# Patient Record
Sex: FEMALE | Race: WHITE | NOT HISPANIC OR LATINO | Employment: OTHER | URBAN - METROPOLITAN AREA
[De-identification: names, ages, dates, MRNs, and addresses within clinical notes are randomized per-mention and may not be internally consistent; named-entity substitution may affect disease eponyms.]

---

## 2019-12-08 ENCOUNTER — HOSPITAL ENCOUNTER (EMERGENCY)
Facility: CLINIC | Age: 69
Discharge: HOME | End: 2019-12-08
Attending: EMERGENCY MEDICINE | Admitting: EMERGENCY MEDICINE
Payer: MEDICARE

## 2019-12-08 ENCOUNTER — APPOINTMENT (OUTPATIENT)
Dept: GENERAL RADIOLOGY | Facility: CLINIC | Age: 69
End: 2019-12-08
Attending: EMERGENCY MEDICINE
Payer: MEDICARE

## 2019-12-08 VITALS
TEMPERATURE: 98.1 F | SYSTOLIC BLOOD PRESSURE: 174 MMHG | WEIGHT: 160 LBS | DIASTOLIC BLOOD PRESSURE: 96 MMHG | HEART RATE: 87 BPM | RESPIRATION RATE: 14 BRPM | BODY MASS INDEX: 27.31 KG/M2 | HEIGHT: 64 IN | OXYGEN SATURATION: 94 %

## 2019-12-08 DIAGNOSIS — S62.102A CLOSED FRACTURE OF LEFT WRIST, INITIAL ENCOUNTER: ICD-10-CM

## 2019-12-08 PROCEDURE — 99152 MOD SED SAME PHYS/QHP 5/>YRS: CPT

## 2019-12-08 PROCEDURE — 40000986 XR WRIST LEFT 2 VIEW: Mod: LT

## 2019-12-08 PROCEDURE — 99153 MOD SED SAME PHYS/QHP EA: CPT

## 2019-12-08 PROCEDURE — 99285 EMERGENCY DEPT VISIT HI MDM: CPT | Mod: 25

## 2019-12-08 PROCEDURE — 25000132 ZZH RX MED GY IP 250 OP 250 PS 637: Mod: GY | Performed by: EMERGENCY MEDICINE

## 2019-12-08 PROCEDURE — 25000128 H RX IP 250 OP 636: Performed by: EMERGENCY MEDICINE

## 2019-12-08 PROCEDURE — 25605 CLTX DST RDL FX/EPHYS SEP W/: CPT | Mod: LT

## 2019-12-08 PROCEDURE — 73110 X-RAY EXAM OF WRIST: CPT | Mod: LT

## 2019-12-08 PROCEDURE — 40000275 ZZH STATISTIC RCP TIME EA 10 MIN

## 2019-12-08 RX ORDER — IBUPROFEN 600 MG/1
600 TABLET, FILM COATED ORAL ONCE
Status: COMPLETED | OUTPATIENT
Start: 2019-12-08 | End: 2019-12-08

## 2019-12-08 RX ORDER — PROPOFOL 10 MG/ML
INJECTION, EMULSION INTRAVENOUS DAILY PRN
Status: COMPLETED | OUTPATIENT
Start: 2019-12-08 | End: 2019-12-08

## 2019-12-08 RX ORDER — PROPOFOL 10 MG/ML
1 INJECTION, EMULSION INTRAVENOUS ONCE
Status: DISCONTINUED | OUTPATIENT
Start: 2019-12-08 | End: 2019-12-08 | Stop reason: HOSPADM

## 2019-12-08 RX ADMIN — PROPOFOL 10 MG: 10 INJECTION, EMULSION INTRAVENOUS at 16:53

## 2019-12-08 RX ADMIN — PROPOFOL 30 MG: 10 INJECTION, EMULSION INTRAVENOUS at 16:52

## 2019-12-08 RX ADMIN — PROPOFOL 70 MG: 10 INJECTION, EMULSION INTRAVENOUS at 16:47

## 2019-12-08 RX ADMIN — PROPOFOL 10 MG: 10 INJECTION, EMULSION INTRAVENOUS at 16:57

## 2019-12-08 RX ADMIN — PROPOFOL 30 MG: 10 INJECTION, EMULSION INTRAVENOUS at 16:51

## 2019-12-08 RX ADMIN — IBUPROFEN 600 MG: 600 TABLET ORAL at 18:15

## 2019-12-08 ASSESSMENT — ENCOUNTER SYMPTOMS
JOINT SWELLING: 1
ARTHRALGIAS: 1

## 2019-12-08 ASSESSMENT — MIFFLIN-ST. JEOR: SCORE: 1240.76

## 2019-12-08 NOTE — ED AVS SNAPSHOT
Emergency Department  [address] [place] [id number]  Phone:  [phone]  Fax:  [phone]                                    Jade Davidson   MRN: [id number]    Department:   Emergency Department   Date of Visit:  12/8/2019           After Visit Summary Signature Page    I have received my discharge instructions, and my questions have been answered. I have discussed any challenges I see with this plan with the nurse or doctor.    ..........................................................................................................................................  Patient/Patient Representative Signature      ..........................................................................................................................................  Patient Representative Print Name and Relationship to Patient    ..................................................               ................................................  Date                                   Time    ..........................................................................................................................................  Reviewed by Signature/Title    ...................................................              ..............................................  Date                                               Time          22EPIC Rev 08/18

## 2019-12-08 NOTE — PROGRESS NOTES
Assisted with conscious sedation with patient.   Patient was placed on EtCo2 monitoring and   6L   Oxymask. Patient SpO2 was 95-98% throughout entire procedure. EtCO2 was 30-34 throughout entire procedure.   Problems: No problems. Pt tolerated procedure well.     RT will continue to monitor.  12/8/2019 5:10 PM  RT Missy

## 2019-12-08 NOTE — ED PROVIDER NOTES
History     Chief Complaint:  Fall      HPI   Jade Davidson is a right handed 69 year old female with no pertinent past medical history who presents to the ED for evaluation of wrist pain after a mechanical fall. The patient reports that she was decorating the [employer] tree around 1230 this afternoon, when she suddenly tripped over a rolled-up rug. The patient fell directly on her left wrist but denies hitting her head or other extremities. She began to experience pain and swelling to the radial aspect of her left wrist and forearm, prompting her visit to the ED. She did apply ice to the area and took 2 tabs of Motrin prior to arrival. Here in the ED, the patient additionally endorses mild numbness to the left wrist. She denies any hip pain, leg pain, finger pain, or other injuries. She is not anticoagulated. 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 Her last PO intake was at 1400.    Allergies:  NKDA     Medications:    The patient is currently on no regular medications.      Past Medical History:    GERD  Migraine  Pneumonia  Urinary calculus    Past Surgical History:    Right breast biopsy  Left wrist pins, fracture    Family History:    No past pertinent family history.    Social History:  Negative for tobacco use.  Negative for alcohol use.  Negative for drug use.   Marital Status:   [2]     Review of Systems   Musculoskeletal: Positive for arthralgias (L wrist) and joint swelling.     10 point review of systems performed and is negative except as above and in HPI.     Physical Exam     Patient Vitals for the past 24 hrs:   BP Temp Temp src Pulse Heart Rate Resp SpO2 Height Weight   12/08/19 1818 -- -- -- -- -- -- 94 % -- --   12/08/19 1817 (!) 174/96 -- -- 87 -- -- -- -- --   12/08/19 1711 (!) 148/76 -- -- 76 -- 14 100 % -- --   12/08/19 1706 (!) 147/89 -- -- 82 -- 15 99 % -- --   12/08/19 1702 137/86 -- -- 84 -- 17 99 % -- --   12/08/19 1657 (!) 148/81 -- -- 78 -- 16 99 % -- --  "  12/08/19 1654 139/88 -- -- 77 -- 19 99 % -- --   12/08/19 1649 -- -- -- 77 -- 15 98 % -- --   12/08/19 1640 (!) 153/87 -- -- 85 -- 16 97 % -- --   12/08/19 1501 (!) 144/80 98.1  F (36.7  C) Oral -- 82 18 96 % 1.626 m (5' 4\") 72.6 kg (160 lb)        Physical Exam  General: Resting on the gurney, appears mildly uncomfortable  Head:  The scalp, face, and head appear normal  Mouth/Throat: Mucus membranes are moist. Mallampati 2.  CV:  Regular rate and rhythm    Normal S1 and S2  No pathological murmur   Resp:  Breath sounds clear to auscultation in all fields and equal bilaterally    Non-labored, no retractions or accessory muscle use    No coarseness    No wheezing   GI:  Abdomen is soft, no rigidity    No tenderness to palpation  MS:  Normal motor assessment of all extremities.    Good capillary refill noted.    Left wrist with tenderness to palpation over the distal radius.  Skin:  No rash or lesions noted.  Neuro: Sensation intact in the hand and fingers. The patient is able to give a thumb's up and ok sign and touch her thumb to pinky. She is able to flex and extend fingers and abduct and adduct fingers.  Brisk capillary refill in the hand.    Psych: Awake. Alert.  Normal affect.      Appropriate interactions.         Emergency Department Course   Imaging:  Radiographic findings were communicated with the patient who voiced understanding of the findings.  XR Wrist 3 views, Left:   Comminuted intra-articular distal radius fracture with  mild dorsal angulation. Comminution and mild depression lunate fossa  articular surface. Normal carpal bone alignment. 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     XR Wrist 3 views, Left:   Interval placement of splint material which limits fine  bony detail. Comminuted distal radial fracture with impaction. There  is improved alignment compared to prior. Normal joint alignment.  Surrounding soft tissue swelling, as per radiology.     [facility]    Procedure: Sedation and Reduction  Date/Time: 12/8/2019 4:07 PM  Performed by: Nicky Hou MD  Authorized by: Nicky Hou MD     UNIVERSAL PROTOCOL   Site Marked: NA  Prior Images Obtained and Reviewed:  Yes  Required items: Required blood products, implants, devices and special equipment available    Patient identity confirmed:  Verbally with patient  Patient was reevaluated immediately before administering moderate or deep sedation or anesthesia  Confirmation Checklist:  Patient's identity using two indicators, relevant allergies, procedure was appropriate and matched the consent or emergent situation and correct equipment/implants were available  Time out: Immediately prior to the procedure a time out was called      SEDATION    Patient Sedated: Yes    Sedation Type:  Moderate (conscious) sedation  Sedation:  Propofol  Vital signs: Vital signs monitored during sedation    PROCEDURE   Patient Tolerance:  Patient tolerated the procedure well with no immediate complications    Length of time physician/provider present for 1:1 monitoring during sedation: 45 (41 minutes ([id number]))      Fracture Reduction     SITE: Left Wrist     CONSENT: Risks and benefits, along with alternative treatment modalities, were discussed with the patient.  The patient consented to the procedure verbally and signed the proper paperwork.    ANESTHESIA:  The patient was sedated by Dr. Hou (please see sedation note)    TECHNIQUE: Traction was applied and angulation was recreated and reduced.  Good alignment was confirmed by post reduction films.  The patient tolerated the procedure well and there were no complications.     OUTCOME:  Rechecked the patient after sedation.  Pain was reduced and feeling more comfortably.     Sugartong Plaster Splint Placement     Splint was applied and after placement I checked and adjusted the fit to ensure proper positioning. Patient was more comfortable with splint in place.  Sensation and circulation are intact after splint placement.      Interventions:  1647 Propofol 70 mg IV  1651 Propofol 30 mg IV  1652 Propofol 30 mg IV  1653 Propofol 10 mg IV  1657 Propofol 10 mg IV  1815 Ibuprofen 600 mg PO    Emergency Department Course:  Nursing notes and vitals reviewed. (1451) I performed an exam of the patient as documented above.     IV inserted. Medicine administered as documented above. Blood drawn. This was sent to the lab for further testing, results above.    The patient was sent for a left wrist x-ray while in the emergency department, findings above.     (1543) I rechecked the patient and discussed the results of her workup thus far.     (1628) I performed a procedural sedation, reduction, and splint placement as noted above. There were no complications of the procedure.     The patient was sent for a repeat left wrist x-ray after sedation and reduction.    (1804) I reevaluated the patient and provided an update in regards to her ED course. We discussed the plan for discharge, and she felt comfortable going home.     Findings and plan explained to the Patient and . Patient discharged home with instructions regarding supportive care, medications, and reasons to return. The importance of close follow-up was reviewed.    I personally reviewed the imaging results with the Patient and answered all related questions prior to discharge.      Impression & Plan    Medical Decision Making:  Jade Davidson is a 69 year old female who presents for evaluation of left wrist pain after a mechanical fall. CMS is intact distally in the extremity.  Xrays reveal a fracture that is successfully reduced in the ED with subsequent splint placement, see above procedure notes. There is no indication for emergent surgery or ortho consultation from the ED. Rather, close follow-up is indicated in the next days.  Splint and fracture precautions for home.  The patients head to toe trauma exam is  otherwise normal at this time and no further trauma workup is needed as I believe there is no signs of serious head, neck, chest, spinal, extremity or abdominal injuries.      Diagnosis:    ICD-10-CM    1. Closed fracture of left wrist, initial encounter S62.102A        Disposition:  discharged to home      Scribe Disclosure:  I, Bailee Mcclendon, am serving as a scribe on 12/8/2019 at 2:51 PM to personally document services performed by Nicky Hou MD based on my observations and the provider's statements to me.        Bailee Mcclendon  12/8/2019    EMERGENCY DEPARTMENT       Nicky Hou MD  12/08/19 2044

## 2021-02-03 ENCOUNTER — HOSPITAL ENCOUNTER (OUTPATIENT)
Dept: BONE DENSITY | Facility: CLINIC | Age: 71
Discharge: HOME | End: 2021-02-03
Attending: INTERNAL MEDICINE | Admitting: INTERNAL MEDICINE
Payer: MEDICARE

## 2021-02-03 DIAGNOSIS — M81.0 AGE RELATED OSTEOPOROSIS, UNSPECIFIED PATHOLOGICAL FRACTURE PRESENCE: ICD-10-CM

## 2021-02-03 PROCEDURE — 77080 DXA BONE DENSITY AXIAL: CPT

## 2021-02-11 ENCOUNTER — HOSPITAL ENCOUNTER (OUTPATIENT)
Dept: MRI IMAGING | Facility: CLINIC | Age: 71
End: 2021-02-11
Attending: INTERNAL MEDICINE
Payer: MEDICARE

## 2021-02-11 ENCOUNTER — HOSPITAL ENCOUNTER (OUTPATIENT)
Dept: CARDIOLOGY | Facility: CLINIC | Age: 71
End: 2021-02-11
Attending: INTERNAL MEDICINE
Payer: MEDICARE

## 2021-02-11 DIAGNOSIS — R42 VERTIGO: ICD-10-CM

## 2021-02-11 PROCEDURE — 93246 EXT ECG>7D<15D RECORDING: CPT

## 2021-02-11 PROCEDURE — 93248 EXT ECG>7D<15D REV&INTERPJ: CPT | Performed by: INTERNAL MEDICINE

## 2021-02-11 PROCEDURE — 70551 MRI BRAIN STEM W/O DYE: CPT

## 2021-09-09 ENCOUNTER — HOSPITAL ENCOUNTER (OUTPATIENT)
Dept: MAMMOGRAPHY | Facility: CLINIC | Age: 71
Discharge: HOME | End: 2021-09-09
Attending: INTERNAL MEDICINE | Admitting: INTERNAL MEDICINE
Payer: MEDICARE

## 2021-09-09 DIAGNOSIS — Z12.31 VISIT FOR SCREENING MAMMOGRAM: ICD-10-CM

## 2021-09-09 PROCEDURE — 77063 BREAST TOMOSYNTHESIS BI: CPT

## 2021-10-23 ENCOUNTER — HEALTH MAINTENANCE LETTER (OUTPATIENT)
Age: 71
End: 2021-10-23

## 2022-07-11 ENCOUNTER — LAB REQUISITION (OUTPATIENT)
Dept: LAB | Facility: CLINIC | Age: 72
End: 2022-07-11
Payer: MEDICARE

## 2022-07-11 DIAGNOSIS — Z00.00 ENCOUNTER FOR GENERAL ADULT MEDICAL EXAMINATION WITHOUT ABNORMAL FINDINGS: ICD-10-CM

## 2022-07-11 DIAGNOSIS — Z01.818 ENCOUNTER FOR OTHER PREPROCEDURAL EXAMINATION: ICD-10-CM

## 2022-07-11 DIAGNOSIS — E78.5 HYPERLIPIDEMIA, UNSPECIFIED: ICD-10-CM

## 2022-07-11 DIAGNOSIS — Z23 ENCOUNTER FOR IMMUNIZATION: ICD-10-CM

## 2022-07-11 LAB
CHOLEST SERPL-MCNC: 178 MG/DL
ERYTHROCYTE [DISTWIDTH] IN BLOOD BY AUTOMATED COUNT: 12.7 % (ref 10–15)
HCT VFR BLD AUTO: 42.3 % (ref 35–47)
HDLC SERPL-MCNC: 57 MG/DL
HGB BLD-MCNC: 13.8 G/DL (ref 11.7–15.7)
LDLC SERPL CALC-MCNC: 91 MG/DL
MCH RBC QN AUTO: 30.3 PG (ref 26.5–33)
MCHC RBC AUTO-ENTMCNC: 32.6 G/DL (ref 31.5–36.5)
MCV RBC AUTO: 93 FL (ref 78–100)
NONHDLC SERPL-MCNC: 121 MG/DL
PLATELET # BLD AUTO: 221 10E3/UL (ref 150–450)
RBC # BLD AUTO: 4.55 10E6/UL (ref 3.8–5.2)
TRIGL SERPL-MCNC: 150 MG/DL
WBC # BLD AUTO: 6.7 10E3/UL (ref 4–11)

## 2022-07-11 PROCEDURE — 80061 LIPID PANEL: CPT | Mod: ORL | Performed by: INTERNAL MEDICINE

## 2022-07-11 PROCEDURE — 85027 COMPLETE CBC AUTOMATED: CPT | Mod: ORL | Performed by: INTERNAL MEDICINE

## 2022-09-26 ENCOUNTER — HOSPITAL ENCOUNTER (OUTPATIENT)
Dept: MAMMOGRAPHY | Facility: CLINIC | Age: 72
Discharge: HOME | End: 2022-09-26
Attending: INTERNAL MEDICINE | Admitting: INTERNAL MEDICINE
Payer: MEDICARE

## 2022-09-26 DIAGNOSIS — Z12.31 VISIT FOR SCREENING MAMMOGRAM: ICD-10-CM

## 2022-09-26 PROCEDURE — 77067 SCR MAMMO BI INCL CAD: CPT

## 2022-11-26 ENCOUNTER — HEALTH MAINTENANCE LETTER (OUTPATIENT)
Age: 72
End: 2022-11-26

## 2023-10-03 ENCOUNTER — LAB REQUISITION (OUTPATIENT)
Dept: LAB | Facility: CLINIC | Age: 73
End: 2023-10-03
Payer: MEDICARE

## 2023-10-03 LAB
ALBUMIN SERPL BCG-MCNC: 4.2 G/DL (ref 3.5–5.2)
ALP SERPL-CCNC: 68 U/L (ref 35–104)
ALT SERPL W P-5'-P-CCNC: 28 U/L (ref 0–50)
ANION GAP SERPL CALCULATED.3IONS-SCNC: 12 MMOL/L (ref 7–15)
AST SERPL W P-5'-P-CCNC: 26 U/L (ref 0–45)
BILIRUB SERPL-MCNC: 0.4 MG/DL
BUN SERPL-MCNC: 20.3 MG/DL (ref 8–23)
CALCIUM SERPL-MCNC: 9.4 MG/DL (ref 8.8–10.2)
CHLORIDE SERPL-SCNC: 106 MMOL/L (ref 98–107)
CREAT SERPL-MCNC: 0.8 MG/DL (ref 0.51–0.95)
DEPRECATED HCO3 PLAS-SCNC: 23 MMOL/L (ref 22–29)
EGFRCR SERPLBLD CKD-EPI 2021: 78 ML/MIN/1.73M2
GLUCOSE SERPL-MCNC: 107 MG/DL (ref 70–99)
POTASSIUM SERPL-SCNC: 4.3 MMOL/L (ref 3.4–5.3)
PROT SERPL-MCNC: 6.8 G/DL (ref 6.4–8.3)
SODIUM SERPL-SCNC: 141 MMOL/L (ref 135–145)

## 2023-10-03 PROCEDURE — 80053 COMPREHEN METABOLIC PANEL: CPT | Mod: ORL | Performed by: FAMILY MEDICINE

## 2023-10-10 ENCOUNTER — HOSPITAL ENCOUNTER (OUTPATIENT)
Dept: MAMMOGRAPHY | Facility: CLINIC | Age: 73
Discharge: HOME | End: 2023-10-10
Attending: INTERNAL MEDICINE | Admitting: INTERNAL MEDICINE
Payer: MEDICARE

## 2023-10-10 DIAGNOSIS — Z12.31 VISIT FOR SCREENING MAMMOGRAM: ICD-10-CM

## 2023-10-10 PROCEDURE — 77067 SCR MAMMO BI INCL CAD: CPT

## 2024-10-21 ENCOUNTER — ANCILLARY PROCEDURE (OUTPATIENT)
Dept: MAMMOGRAPHY | Facility: CLINIC | Age: 74
End: 2024-10-21
Attending: INTERNAL MEDICINE
Payer: MEDICARE

## 2024-10-21 DIAGNOSIS — Z12.31 VISIT FOR SCREENING MAMMOGRAM: ICD-10-CM

## 2024-10-21 PROCEDURE — 77067 SCR MAMMO BI INCL CAD: CPT | Mod: TC | Performed by: RADIOLOGY

## 2024-10-21 PROCEDURE — 77063 BREAST TOMOSYNTHESIS BI: CPT | Mod: TC | Performed by: RADIOLOGY

## 2024-12-21 ENCOUNTER — HEALTH MAINTENANCE LETTER (OUTPATIENT)
Age: 74
End: 2024-12-21